# Patient Record
Sex: MALE | Race: BLACK OR AFRICAN AMERICAN | NOT HISPANIC OR LATINO | URBAN - METROPOLITAN AREA
[De-identification: names, ages, dates, MRNs, and addresses within clinical notes are randomized per-mention and may not be internally consistent; named-entity substitution may affect disease eponyms.]

---

## 2017-02-15 ENCOUNTER — HISTORICAL (OUTPATIENT)
Dept: INTERNAL MEDICINE | Facility: CLINIC | Age: 57
End: 2017-02-15

## 2017-10-25 LAB
COLOR STL: NORMAL
CONSISTENCY STL: NORMAL
HEMOCCULT SP1 STL QL: NEGATIVE

## 2017-10-27 LAB
COLOR STL: NORMAL
CONSISTENCY STL: NORMAL
HEMOCCULT SP2 STL QL: NEGATIVE

## 2017-10-29 LAB
COLOR STL: NORMAL
CONSISTENCY STL: NORMAL

## 2017-10-31 ENCOUNTER — HISTORICAL (OUTPATIENT)
Dept: INTERNAL MEDICINE | Facility: CLINIC | Age: 57
End: 2017-10-31

## 2017-11-06 ENCOUNTER — HISTORICAL (OUTPATIENT)
Dept: INTERNAL MEDICINE | Facility: CLINIC | Age: 57
End: 2017-11-06

## 2017-11-06 LAB
APPEARANCE, UA: CLEAR
BACTERIA #/AREA URNS AUTO: ABNORMAL /[HPF]
BILIRUB UR QL STRIP: NEGATIVE
COLOR UR: NORMAL
CREAT UR-MCNC: 142 MG/DL
GLUCOSE (UA): NORMAL
HAV IGM SERPL QL IA: NONREACTIVE
HBV CORE IGM SERPL QL IA: NONREACTIVE
HBV SURFACE AG SERPL QL IA: NEGATIVE
HCV AB SERPL QL IA: NONREACTIVE
HGB UR QL STRIP: NEGATIVE
HIV 1+2 AB+HIV1 P24 AG SERPL QL IA: NONREACTIVE
HYALINE CASTS #/AREA URNS LPF: ABNORMAL /[LPF]
KETONES UR QL STRIP: NEGATIVE
LEUKOCYTE ESTERASE UR QL STRIP: NEGATIVE
MICROALBUMIN UR-MCNC: 5 MG/L (ref 0–19)
MICROALBUMIN/CREAT RATIO PNL UR: NORMAL MCG/MG CR (ref 0–29)
NITRITE UR QL STRIP: NEGATIVE
PH UR STRIP: 7 [PH] (ref 4.5–8)
PROT UR QL STRIP: NEGATIVE
RBC #/AREA URNS AUTO: ABNORMAL /[HPF]
SP GR UR STRIP: 1.01 (ref 1–1.03)
SQUAMOUS #/AREA URNS LPF: ABNORMAL /[LPF]
TSH SERPL-ACNC: 1.26 MIU/L (ref 0.36–3.74)
UROBILINOGEN UR STRIP-ACNC: NORMAL
WBC #/AREA URNS AUTO: ABNORMAL /HPF

## 2017-11-14 ENCOUNTER — HISTORICAL (OUTPATIENT)
Dept: INTERNAL MEDICINE | Facility: CLINIC | Age: 57
End: 2017-11-14

## 2018-05-10 ENCOUNTER — HISTORICAL (OUTPATIENT)
Dept: INTERNAL MEDICINE | Facility: CLINIC | Age: 58
End: 2018-05-10

## 2018-05-10 LAB
ABS NEUT (OLG): 1.41 X10(3)/MCL (ref 2.1–9.2)
ALBUMIN SERPL-MCNC: 3.8 GM/DL (ref 3.4–5)
ALBUMIN/GLOB SERPL: 1 RATIO (ref 1–2)
ALP SERPL-CCNC: 67 UNIT/L (ref 45–117)
ALT SERPL-CCNC: 16 UNIT/L (ref 12–78)
APPEARANCE, UA: CLEAR
AST SERPL-CCNC: 17 UNIT/L (ref 15–37)
BACTERIA #/AREA URNS AUTO: ABNORMAL /[HPF]
BASOPHILS # BLD AUTO: 0.03 X10(3)/MCL
BASOPHILS NFR BLD AUTO: 1 %
BILIRUB SERPL-MCNC: 0.9 MG/DL (ref 0.2–1)
BILIRUB UR QL STRIP: NEGATIVE
BILIRUBIN DIRECT+TOT PNL SERPL-MCNC: 0.3 MG/DL
BILIRUBIN DIRECT+TOT PNL SERPL-MCNC: 0.6 MG/DL
BUN SERPL-MCNC: 17 MG/DL (ref 7–18)
CALCIUM SERPL-MCNC: 8.4 MG/DL (ref 8.5–10.1)
CHLORIDE SERPL-SCNC: 106 MMOL/L (ref 98–107)
CHOLEST SERPL-MCNC: 124 MG/DL
CHOLEST/HDLC SERPL: 2.4 {RATIO} (ref 0–5)
CO2 SERPL-SCNC: 30 MMOL/L (ref 21–32)
COLOR UR: YELLOW
CREAT SERPL-MCNC: 1.3 MG/DL (ref 0.6–1.3)
EOSINOPHIL # BLD AUTO: 0.08 X10(3)/MCL
EOSINOPHIL NFR BLD AUTO: 2 %
ERYTHROCYTE [DISTWIDTH] IN BLOOD BY AUTOMATED COUNT: 14.1 % (ref 11.5–14.5)
GLOBULIN SER-MCNC: 3.8 GM/ML (ref 2.3–3.5)
GLUCOSE (UA): NORMAL
GLUCOSE SERPL-MCNC: 79 MG/DL (ref 74–106)
HCT VFR BLD AUTO: 44.1 % (ref 40–51)
HDLC SERPL-MCNC: 52 MG/DL
HGB BLD-MCNC: 14.6 GM/DL (ref 13.5–17.5)
HGB UR QL STRIP: 0.03 MG/DL
HIV 1+2 AB+HIV1 P24 AG SERPL QL IA: NONREACTIVE
HYALINE CASTS #/AREA URNS LPF: ABNORMAL /[LPF]
IMM GRANULOCYTES # BLD AUTO: 0.01 10*3/UL
IMM GRANULOCYTES NFR BLD AUTO: 0 %
KETONES UR QL STRIP: NEGATIVE
LDLC SERPL CALC-MCNC: 59 MG/DL (ref 0–130)
LEUKOCYTE ESTERASE UR QL STRIP: NEGATIVE
LYMPHOCYTES # BLD AUTO: 1.74 X10(3)/MCL
LYMPHOCYTES NFR BLD AUTO: 48 % (ref 13–40)
MCH RBC QN AUTO: 28.6 PG (ref 26–34)
MCHC RBC AUTO-ENTMCNC: 33.1 GM/DL (ref 31–37)
MCV RBC AUTO: 86.3 FL (ref 80–100)
MONOCYTES # BLD AUTO: 0.36 X10(3)/MCL
MONOCYTES NFR BLD AUTO: 10 % (ref 4–12)
NEUTROPHILS # BLD AUTO: 1.41 X10(3)/MCL
NEUTROPHILS NFR BLD AUTO: 39 X10(3)/MCL
NITRITE UR QL STRIP: NEGATIVE
PH UR STRIP: 6.5 [PH] (ref 4.5–8)
PLATELET # BLD AUTO: 223 X10(3)/MCL (ref 130–400)
PMV BLD AUTO: 9.6 FL (ref 7.4–10.4)
POTASSIUM SERPL-SCNC: 3.8 MMOL/L (ref 3.5–5.1)
PROT SERPL-MCNC: 7.6 GM/DL (ref 6.4–8.2)
PROT UR QL STRIP: NEGATIVE
RBC # BLD AUTO: 5.11 X10(6)/MCL (ref 4.5–5.9)
RBC #/AREA URNS AUTO: ABNORMAL /[HPF]
SODIUM SERPL-SCNC: 139 MMOL/L (ref 136–145)
SP GR UR STRIP: 1.02 (ref 1–1.03)
SQUAMOUS #/AREA URNS LPF: ABNORMAL /[LPF]
TRIGL SERPL-MCNC: 65 MG/DL
UROBILINOGEN UR STRIP-ACNC: 2 MG/DL
VLDLC SERPL CALC-MCNC: 13 MG/DL
WBC # SPEC AUTO: 3.6 X10(3)/MCL (ref 4.5–11)
WBC #/AREA URNS AUTO: ABNORMAL /HPF

## 2018-09-21 ENCOUNTER — HISTORICAL (OUTPATIENT)
Dept: INTERNAL MEDICINE | Facility: CLINIC | Age: 58
End: 2018-09-21

## 2019-07-17 ENCOUNTER — HISTORICAL (OUTPATIENT)
Dept: ADMINISTRATIVE | Facility: HOSPITAL | Age: 59
End: 2019-07-17

## 2019-07-17 LAB
ABS NEUT (OLG): 1.76 X10(3)/MCL (ref 2.1–9.2)
ALBUMIN SERPL-MCNC: 3.6 GM/DL (ref 3.4–5)
ALBUMIN/GLOB SERPL: 1 RATIO (ref 1.1–2)
ALP SERPL-CCNC: 68 UNIT/L (ref 45–117)
ALT SERPL-CCNC: 19 UNIT/L (ref 12–78)
APPEARANCE, UA: CLEAR
AST SERPL-CCNC: 25 UNIT/L (ref 15–37)
BACTERIA #/AREA URNS AUTO: ABNORMAL /[HPF]
BASOPHILS # BLD AUTO: 0.03 X10(3)/MCL
BASOPHILS NFR BLD AUTO: 1 %
BILIRUB SERPL-MCNC: 0.5 MG/DL (ref 0.2–1)
BILIRUB UR QL STRIP: NEGATIVE
BILIRUBIN DIRECT+TOT PNL SERPL-MCNC: 0.2 MG/DL
BILIRUBIN DIRECT+TOT PNL SERPL-MCNC: 0.3 MG/DL
BUN SERPL-MCNC: 25 MG/DL (ref 7–18)
CALCIUM SERPL-MCNC: 8.5 MG/DL (ref 8.5–10.1)
CHLORIDE SERPL-SCNC: 109 MMOL/L (ref 98–107)
CHOLEST SERPL-MCNC: 134 MG/DL
CHOLEST/HDLC SERPL: 2.8 {RATIO} (ref 0–5)
CO2 SERPL-SCNC: 30 MMOL/L (ref 21–32)
COLOR UR: ABNORMAL
CREAT SERPL-MCNC: 1.4 MG/DL (ref 0.6–1.3)
EOSINOPHIL # BLD AUTO: 0.11 10*3/UL
EOSINOPHIL NFR BLD AUTO: 3 %
ERYTHROCYTE [DISTWIDTH] IN BLOOD BY AUTOMATED COUNT: 14 % (ref 11.5–14.5)
EST. AVERAGE GLUCOSE BLD GHB EST-MCNC: 131 MG/DL
GLOBULIN SER-MCNC: 3.6 GM/ML (ref 2.3–3.5)
GLUCOSE (UA): NORMAL
GLUCOSE SERPL-MCNC: 99 MG/DL (ref 74–106)
HAV IGM SERPL QL IA: NONREACTIVE
HBA1C MFR BLD: 6.2 % (ref 4.2–6.3)
HBV CORE IGM SERPL QL IA: NONREACTIVE
HBV SURFACE AG SERPL QL IA: NEGATIVE
HCT VFR BLD AUTO: 44.3 % (ref 40–51)
HCV AB SERPL QL IA: NONREACTIVE
HDLC SERPL-MCNC: 48 MG/DL
HGB BLD-MCNC: 14.4 GM/DL (ref 13.5–17.5)
HGB UR QL STRIP: 0.03 MG/DL
HIV 1+2 AB+HIV1 P24 AG SERPL QL IA: NONREACTIVE
HYALINE CASTS #/AREA URNS LPF: ABNORMAL /[LPF]
IMM GRANULOCYTES # BLD AUTO: 0.01 10*3/UL
IMM GRANULOCYTES NFR BLD AUTO: 0 %
KETONES UR QL STRIP: NEGATIVE
LDLC SERPL CALC-MCNC: 69 MG/DL (ref 0–130)
LEUKOCYTE ESTERASE UR QL STRIP: 25 LEU/UL
LYMPHOCYTES # BLD AUTO: 1.82 X10(3)/MCL
LYMPHOCYTES NFR BLD AUTO: 44 % (ref 13–40)
MCH RBC QN AUTO: 28.5 PG (ref 26–34)
MCHC RBC AUTO-ENTMCNC: 32.5 GM/DL (ref 31–37)
MCV RBC AUTO: 87.7 FL (ref 80–100)
MONOCYTES # BLD AUTO: 0.44 X10(3)/MCL
MONOCYTES NFR BLD AUTO: 11 % (ref 4–12)
NEUTROPHILS # BLD AUTO: 1.76 X10(3)/MCL
NEUTROPHILS NFR BLD AUTO: 42 X10(3)/MCL
NITRITE UR QL STRIP: NEGATIVE
PH UR STRIP: 6 [PH] (ref 4.5–8)
PLATELET # BLD AUTO: 194 X10(3)/MCL (ref 130–400)
PMV BLD AUTO: 9.6 FL (ref 7.4–10.4)
POTASSIUM SERPL-SCNC: 3.9 MMOL/L (ref 3.5–5.1)
PROT SERPL-MCNC: 7.2 GM/DL (ref 6.4–8.2)
PROT UR QL STRIP: NEGATIVE
RBC # BLD AUTO: 5.05 X10(6)/MCL (ref 4.5–5.9)
RBC #/AREA URNS AUTO: ABNORMAL /[HPF]
SODIUM SERPL-SCNC: 142 MMOL/L (ref 136–145)
SP GR UR STRIP: 1.02 (ref 1–1.03)
SQUAMOUS #/AREA URNS LPF: ABNORMAL /[LPF]
TRIGL SERPL-MCNC: 83 MG/DL
TSH SERPL-ACNC: 1.43 MIU/L (ref 0.36–3.74)
UROBILINOGEN UR STRIP-ACNC: 2 MG/DL
VLDLC SERPL CALC-MCNC: 17 MG/DL
WBC # SPEC AUTO: 4.2 X10(3)/MCL (ref 4.5–11)
WBC #/AREA URNS AUTO: ABNORMAL /HPF

## 2019-07-26 ENCOUNTER — HISTORICAL (OUTPATIENT)
Dept: INTERNAL MEDICINE | Facility: CLINIC | Age: 59
End: 2019-07-26

## 2020-08-06 ENCOUNTER — HISTORICAL (OUTPATIENT)
Dept: INTERNAL MEDICINE | Facility: CLINIC | Age: 60
End: 2020-08-06

## 2020-08-06 LAB
ABS NEUT (OLG): 1.44 X10(3)/MCL (ref 2.1–9.2)
ALBUMIN SERPL-MCNC: 3.5 GM/DL (ref 3.4–5)
ALBUMIN/GLOB SERPL: 0.9 RATIO (ref 1.1–2)
ALP SERPL-CCNC: 66 UNIT/L (ref 45–117)
ALT SERPL-CCNC: 19 UNIT/L (ref 12–78)
AST SERPL-CCNC: 13 UNIT/L (ref 15–37)
BASOPHILS # BLD AUTO: 0 X10(3)/MCL (ref 0–0.2)
BASOPHILS NFR BLD AUTO: 1 %
BILIRUB SERPL-MCNC: 0.6 MG/DL (ref 0.2–1)
BILIRUBIN DIRECT+TOT PNL SERPL-MCNC: 0.2 MG/DL (ref 0–0.2)
BILIRUBIN DIRECT+TOT PNL SERPL-MCNC: 0.4 MG/DL
BUN SERPL-MCNC: 19 MG/DL (ref 7–18)
CALCIUM SERPL-MCNC: 8.8 MG/DL (ref 8.5–10.1)
CHLORIDE SERPL-SCNC: 109 MMOL/L (ref 98–107)
CHOLEST SERPL-MCNC: 120 MG/DL
CHOLEST/HDLC SERPL: 2.3 {RATIO} (ref 0–5)
CO2 SERPL-SCNC: 29 MMOL/L (ref 21–32)
CREAT SERPL-MCNC: 1.3 MG/DL (ref 0.6–1.3)
EOSINOPHIL # BLD AUTO: 0.1 X10(3)/MCL (ref 0–0.9)
EOSINOPHIL NFR BLD AUTO: 4 %
ERYTHROCYTE [DISTWIDTH] IN BLOOD BY AUTOMATED COUNT: 14.3 % (ref 11.5–14.5)
EST. AVERAGE GLUCOSE BLD GHB EST-MCNC: 134 MG/DL
GLOBULIN SER-MCNC: 4 GM/ML (ref 2.3–3.5)
GLUCOSE SERPL-MCNC: 97 MG/DL (ref 74–106)
HBA1C MFR BLD: 6.3 % (ref 4.2–6.3)
HCT VFR BLD AUTO: 42.4 % (ref 40–51)
HDLC SERPL-MCNC: 52 MG/DL (ref 40–59)
HGB BLD-MCNC: 14 GM/DL (ref 13.5–17.5)
LDLC SERPL CALC-MCNC: 50 MG/DL
LYMPHOCYTES # BLD AUTO: 1.5 X10(3)/MCL (ref 0.6–4.6)
LYMPHOCYTES NFR BLD AUTO: 43 %
MCH RBC QN AUTO: 28.9 PG (ref 26–34)
MCHC RBC AUTO-ENTMCNC: 33 GM/DL (ref 31–37)
MCV RBC AUTO: 87.4 FL (ref 80–100)
MONOCYTES # BLD AUTO: 0.4 X10(3)/MCL (ref 0.1–1.3)
MONOCYTES NFR BLD AUTO: 11 %
NEUTROPHILS # BLD AUTO: 1.44 X10(3)/MCL (ref 2.1–9.2)
NEUTROPHILS NFR BLD AUTO: 42 %
PLATELET # BLD AUTO: 196 X10(3)/MCL (ref 130–400)
PMV BLD AUTO: 9.3 FL (ref 7.4–10.4)
POTASSIUM SERPL-SCNC: 4.2 MMOL/L (ref 3.5–5.1)
PROT SERPL-MCNC: 7.5 GM/DL (ref 6.4–8.2)
RBC # BLD AUTO: 4.85 X10(6)/MCL (ref 4.5–5.9)
SODIUM SERPL-SCNC: 140 MMOL/L (ref 136–145)
TRIGL SERPL-MCNC: 91 MG/DL
TSH SERPL-ACNC: 1.13 MIU/L (ref 0.36–3.74)
VLDLC SERPL CALC-MCNC: 18 MG/DL
WBC # SPEC AUTO: 3.4 X10(3)/MCL (ref 4.5–11)

## 2020-11-20 ENCOUNTER — HISTORICAL (OUTPATIENT)
Dept: RESPIRATORY THERAPY | Facility: HOSPITAL | Age: 60
End: 2020-11-20

## 2020-11-27 ENCOUNTER — HISTORICAL (OUTPATIENT)
Dept: RADIOLOGY | Facility: HOSPITAL | Age: 60
End: 2020-11-27

## 2021-02-02 ENCOUNTER — HISTORICAL (OUTPATIENT)
Dept: INTERNAL MEDICINE | Facility: CLINIC | Age: 61
End: 2021-02-02

## 2021-02-02 LAB
ABS NEUT (OLG): 1.28 X10(3)/MCL (ref 2.1–9.2)
ALBUMIN SERPL-MCNC: 3.5 GM/DL (ref 3.4–4.8)
ALBUMIN/GLOB SERPL: 1.1 RATIO (ref 1.1–2)
ALP SERPL-CCNC: 57 UNIT/L (ref 40–150)
ALT SERPL-CCNC: 12 UNIT/L (ref 0–55)
AST SERPL-CCNC: 21 UNIT/L (ref 5–34)
BASOPHILS # BLD AUTO: 0 X10(3)/MCL (ref 0–0.2)
BASOPHILS NFR BLD AUTO: 1 %
BILIRUB SERPL-MCNC: 0.6 MG/DL
BILIRUBIN DIRECT+TOT PNL SERPL-MCNC: 0.3 MG/DL (ref 0–0.5)
BILIRUBIN DIRECT+TOT PNL SERPL-MCNC: 0.3 MG/DL (ref 0–0.8)
BUN SERPL-MCNC: 16 MG/DL (ref 8.4–25.7)
CALCIUM SERPL-MCNC: 8.8 MG/DL (ref 8.8–10)
CHLORIDE SERPL-SCNC: 106 MMOL/L (ref 98–107)
CHOLEST SERPL-MCNC: 141 MG/DL
CHOLEST/HDLC SERPL: 3 {RATIO} (ref 0–5)
CO2 SERPL-SCNC: 30 MMOL/L (ref 23–31)
CREAT SERPL-MCNC: 1.22 MG/DL (ref 0.73–1.18)
EOSINOPHIL # BLD AUTO: 0.1 X10(3)/MCL (ref 0–0.9)
EOSINOPHIL NFR BLD AUTO: 4 %
ERYTHROCYTE [DISTWIDTH] IN BLOOD BY AUTOMATED COUNT: 14.3 % (ref 11.5–14.5)
EST. AVERAGE GLUCOSE BLD GHB EST-MCNC: 122.6 MG/DL
GLOBULIN SER-MCNC: 3.2 GM/DL (ref 2.4–3.5)
GLUCOSE SERPL-MCNC: 97 MG/DL (ref 82–115)
HBA1C MFR BLD: 5.9 %
HCT VFR BLD AUTO: 42.1 % (ref 40–51)
HDLC SERPL-MCNC: 48 MG/DL (ref 35–60)
HGB BLD-MCNC: 13.6 GM/DL (ref 13.5–17.5)
IMM GRANULOCYTES # BLD AUTO: 0.01 10*3/UL
IMM GRANULOCYTES NFR BLD AUTO: 0 %
LDLC SERPL CALC-MCNC: 83 MG/DL (ref 50–140)
LYMPHOCYTES # BLD AUTO: 1.6 X10(3)/MCL (ref 0.6–4.6)
LYMPHOCYTES NFR BLD AUTO: 47 %
MCH RBC QN AUTO: 28.5 PG (ref 26–34)
MCHC RBC AUTO-ENTMCNC: 32.3 GM/DL (ref 31–37)
MCV RBC AUTO: 88.3 FL (ref 80–100)
MONOCYTES # BLD AUTO: 0.4 X10(3)/MCL (ref 0.1–1.3)
MONOCYTES NFR BLD AUTO: 11 %
NEUTROPHILS # BLD AUTO: 1.28 X10(3)/MCL (ref 2.1–9.2)
NEUTROPHILS NFR BLD AUTO: 37 %
PLATELET # BLD AUTO: 197 X10(3)/MCL (ref 130–400)
PMV BLD AUTO: 9.5 FL (ref 7.4–10.4)
POTASSIUM SERPL-SCNC: 3.9 MMOL/L (ref 3.5–5.1)
PROT SERPL-MCNC: 6.7 GM/DL (ref 5.8–7.6)
PSA SERPL-MCNC: 2.25 NG/ML
RBC # BLD AUTO: 4.77 X10(6)/MCL (ref 4.5–5.9)
SODIUM SERPL-SCNC: 142 MMOL/L (ref 136–145)
TRIGL SERPL-MCNC: 50 MG/DL (ref 34–140)
TSH SERPL-ACNC: 1.13 UIU/ML (ref 0.35–4.94)
VLDLC SERPL CALC-MCNC: 10 MG/DL
WBC # SPEC AUTO: 3.5 X10(3)/MCL (ref 4.5–11)

## 2021-06-30 ENCOUNTER — HISTORICAL (OUTPATIENT)
Dept: INTERNAL MEDICINE | Facility: CLINIC | Age: 61
End: 2021-06-30

## 2022-04-07 ENCOUNTER — HISTORICAL (OUTPATIENT)
Dept: ADMINISTRATIVE | Facility: HOSPITAL | Age: 62
End: 2022-04-07

## 2022-04-23 VITALS
SYSTOLIC BLOOD PRESSURE: 120 MMHG | HEIGHT: 65 IN | WEIGHT: 144.63 LBS | OXYGEN SATURATION: 95 % | BODY MASS INDEX: 24.1 KG/M2 | DIASTOLIC BLOOD PRESSURE: 74 MMHG

## 2022-05-01 NOTE — HISTORICAL [facility] CERNER
This is a historical note converted from [employer]. Formatting and pictures may have been removed.  Please reference [employer] for original formatting and attached multimedia. Chief Complaint  bilateral foot fungus  History of Present Illness  This patient is a 58 year old  male here today for a follow up. He has a past medical history of HTN, impaired fasting glucose, COPD, asthma, BPH, and right carpal tunnel syndrome. 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 Started using?OTV antifungal powder last week.?Reports good control of asthma/COPD. Using rescue inhaler about once a day or less. CTS well controlled with use of?night splint. Successfully quit smoking on his?own. Reviewed and discussed lab results. ?No complaints today.  Review of Systems  See HPI for details  ?   Constitutional: Denies Change in appetite. Denies Chills. Denies?Fever. Denies Night sweats.  Eye: Denies Blurred vision. Denies Discharge. Denies?Eye pain.  ENT: Denies Decreased hearing. Denies Sore throat. Denies Swollen glands.  Respiratory: Denies Cough. Denies Shortness of breath. Denies Shortness of breath with exertion. Denies Wheezing.  Cardiovascular: Denies Chest pain at rest. Denies Chest pain with exertion. Denies Irregular heartbeat. Denies Palpitations.  Gastrointestinal: Denies Abdominal pain. Denies Diarrhea. Denies Nausea. Denies Vomiting. Denies Hematemesis or Hematochezia.  Genitourinary: Denies Dysuria. Denies Urinary frequency. Denies Urinary urgency. Denies Blood in urine.  Endocrine: Denies Cold intolerance. Denies Excessive thirst. Denies Heat intolerance. Denies Weight loss. Denies Weight gain.  Musculoskeletal: Denies Painful joints. Denies Weakness.  Integumentary: Denies Rash. Denies Itching. Denies Dry skin.  Neurologic: Denies Dizziness. Denies Fainting. Denies Headache.  Psychiatric: Denies Depression. Denies Anxiety. Denies Suicidal/Homicidal ideations.  ?   All Other  ROS: Negative except as stated in HPI.  Physical Exam  Vitals & Measurements  T:?36.9? ?C (Oral)? HR:?60(Peripheral)? RR:?16? BP:?136/85?  HT:?168?cm? WT:?70.5?kg? BMI:?24.98?  General: Alert and oriented, No acute distress.  Head: Normocephalic, Atraumatic.  Eye: Pupils are equal, round and reactive to light, Extraocular movements are intact, Sclera non-icteric.  Ears/Nose/Throat:Normal, Mucosa moist,Clear.  Neck/Thyroid: Supple, Non-tender,No carotid bruit,No palpable thyromegaly or thyroid nodule,?No lymphadenopathy, No JVD, Full range of motion.  Respiratory:Clear to auscultation bilaterally; No wheezes, rales or rhonchi,Non-labored respirations, Symmetrical chest wall expansion.  Cardiovascular:Regular rate and rhythm,S1/S2 normal,No murmurs, rubs or gallops.  Gastrointestinal:Soft,Non-tender,Non-distended,Normal bowel sounds,No palpable organomegaly.  Musculoskeletal: Normal range of motion.  Integumentary: Warm, Dry, Intact,No suspicious lesions, Tinea pedis interdigitally bilaterally  Extremities:No clubbing, cyanosis or edema  Neurologic: No focal deficits, Cranial Nerves II-XII are grossly intact, Motor strength normal upper and lower extremities, Sensory exam intact.  Psychiatric: Normal interaction,Coherent speech,?Euthymic mood,Appropriate affect  Assessment/Plan  1.?Hypertension?I10  ???Continue Amlodipine 5mg daily.  Low Sodium Diet (Dash Diet - less than 2 grams of sodium per day).  Monitor Blood Pressure daily and log. Report any consistent numbers greater than 140/90.  Maintain healthy weight with goal BMI <30. Exercise 30 minutes per day 5 days per week.  Ordered:  1160F- Medication reconciliation completed during visit, Hypertension  COPD (chronic obstructive pulmonary disease)  Impaired fasting glucose, [facility], 07/17/19 9:09:00 CDT  Clinic Follow up, *Est. 01/17/20 3:00:00 CST, Order for future visit, Hypertension  COPD (chronic obstructive pulmonary disease)  Impaired fasting  glucose, [facility]  Office/Outpatient Visit Level 4 Established [id number] PC, Hypertension  COPD (chronic obstructive pulmonary disease)  Impaired fasting glucose, [facility], 07/17/19 9:09:00 CDT  ?  2.?COPD (chronic obstructive pulmonary disease)?J44.9  ??Last PFT 11/2017 - Moderate obstruction with + bronchodilator response. / Last CXR 9/2017 - Normal.  ?Continue Advair 250mcg/50mcg BID and?albuterol QID PRN.  ?Use inhalers as prescribed (rinse mouth after use of steroid inhalers). Use long term inhalers?daily and rescue inhaler as needed.  Avoid triggers (high humidity, strong odors, chemical fumes).  Report signs of upper respiratory infection immediately for early treatment.  Flu shot recommended yearly.  Practice abdominal breathing. Eat smaller, more frequent meals.  Ordered:  1160F- Medication reconciliation completed during visit, Hypertension  COPD (chronic obstructive pulmonary disease)  Impaired fasting glucose, [facility], 07/17/19 9:09:00 CDT  Clinic Follow up, *Est. 01/17/20 3:00:00 CST, Order for future visit, Hypertension  COPD (chronic obstructive pulmonary disease)  Impaired fasting glucose, [facility]  Office/Outpatient Visit Level 4 Established [id number] PC, Hypertension  COPD (chronic obstructive pulmonary disease)  Impaired fasting glucose, [facility], 07/17/19 9:09:00 CDT  ?  3.?Impaired fasting glucose?R73.01  ?A1C 6.2 -?7/2019.  Follow ADA Diet. Avoid soda, simple sweets, and limit rice/pasta/breads/starches.  Maintain healthy weight with goal BMI <30.? Exercise 5 times?per week for 30?minutes per day.  Ordered:  1160F- Medication reconciliation completed during visit, Hypertension  COPD (chronic obstructive pulmonary disease)  Impaired fasting glucose, [facility], 07/17/19 9:09:00 CDT  Clinic Follow up, *Est. 01/17/20 3:00:00 CST, Order for future visit, Hypertension  COPD (chronic obstructive pulmonary disease)  Impaired fasting glucose, [facility]  Office/Outpatient Visit Level 4 Established [id number] PC, Hypertension  COPD (chronic obstructive pulmonary disease)  Impaired fasting glucose, [facility], 07/17/19 9:09:00 CDT  ?  4.?Tinea pedis of both feet?B35.3  ?Clotrimazole cream interdigitally BID.  Stressed importance of keeping feet dry and continuing antifungal powder use.  ?  Orders:  albuterol, See Instructions, PRN PRN cough or wheezing, INHALE 2 PUFFS BY MOUTH 4 TIMES DAILY, # 18 gm, 2 Refill(s), Pharmacy: [facility]  amLODIPine, 5 mg = 1 tab(s), Oral, Daily, # 30 tab(s), 11 Refill(s), Pharmacy: [facility]  aspirin, 81 mg = 1 tab(s), Oral, Daily, # 30 tab(s), 11 Refill(s), Pharmacy: [facility]  clotrimazole topical, 1 samir, TOP, BID, # 45 gm, 1 Refill(s), Pharmacy: [facility]  fluticasone-salmeterol, 1 inh, INH, BID, # 60 EA, 11 Refill(s), Pharmacy: [facility]  tetanus/diphth/pertuss (Tdap) adult/adol, 0.5 mL, form: Injection, IM, Once-Unscheduled, first dose 07/17/19 9:08:00 CDT  Hepatitis Panel [facility], Routine collect, 07/17/19 6:48:00 CDT, Blood, Stop date 07/17/19 6:48:00 CDT, Lab Collect, 07/17/19 6:48:00 CDT  Occult Blood Fecal Immunoassay, Routine collect, Stool, Order for future visit, 07/17/19 9:09:00 CDT, Stop date 07/17/19 9:09:00 CDT, Nurse collect, Screening for malignant neoplasm of colon  Follow up in 6 months and PRN.  Referrals  Clinic Follow up, *Est. 01/17/20 3:00:00 CST, Order for future visit, Hypertension  COPD (chronic obstructive pulmonary disease)  Impaired fasting glucose, [facility]   Problem List/Past Medical History  Ongoing  Asthma  BPH (benign prostatic hyperplasia)  Carpal tunnel syndrome  COPD (chronic obstructive pulmonary disease)  Hypertension  Impaired fasting glucose  Tobacco user  Tobacco user  Well adult exam  Historical  No qualifying data  Procedure/Surgical History  denies   Medications  Advair Diskus 250 mcg-50 mcg inhalation  powder, 1 inh, INH, BID, 11 refills  aspirin 81 mg oral Delayed Release (EC) tablet, 81 mg= 1 tab(s), Oral, Daily, 11 refills  clotrimazole 1% topical cream, 1 samir, TOP, BID, 1 refills  Norvasc 5 mg oral tablet, 5 mg= 1 tab(s), Oral, Daily, 11 refills  Proventil HFA 90 mcg/inh inhalation aerosol with adapter, See Instructions, PRN, 2 refills  tetanus/diphth/pertuss (Tdap) adult/adol 5 units-2.5 units-18.5 mcg/0.5 mL intramuscular suspension, 0.5 mL, IM, Once-Unscheduled  Allergies  No Known Allergies  Social History  Abuse/Neglect  No, No, Yes, 07/17/2019  Alcohol - Denies Alcohol Use, 10/21/2014  Employment/School  Employed, Work/School description: [employer]. Highest education level: None. Operates hazardous equipment: Yes. Workplace hazards: Hazardous materials, Heavy lifting/twisting, Loud noises., 10/19/2016  Home/Environment  Lives with Children, Spouse. Living situation: Home/Independent. Home equipment: BP MONITOR. Alcohol abuse in household: No. Substance abuse in household: No. Smoker in household: Yes. Feels unsafe at home: No. Safe place to go: Yes. Family/Friends available for support: Yes., 10/19/2016  Nutrition/Health  Regular, Caffeine intake amount: COOFFEE 1 CUP IN MORNING. 1 SODA. Wants to lose weight: No. Sleeping concerns: No. Feels highly stressed: Yes., 10/19/2016  Substance Use - Denies Substance Abuse, 10/21/2014  Past, 03/12/2016  Tobacco - Medium Risk, 10/21/2014  Former smoker, quit more than 30 days ago, N/A, 07/17/2019  Family History  Alzheimers disease: Mother.  Cancer - unknown origin: Father.  Throat cancer: Brother.  Immunizations  Vaccine Date Status   pneumococcal 13-valent conjugate vaccine 01/16/2019 Given   Health Maintenance  Health Maintenance  ???Pending?(in the next year)  ??? ??OverDue  ??? ? ? ?Asthma Management-Asthma Medication Prescribed due??and every?  ??? ? ? ?COPD Maintenance-Spirometry due??and every?  ??? ? ? ?Diabetes Screening due??and every?  ??? ? ?  ?Smoking Cessation due??01/01/19??and every 1??year(s)  ??? ??Due?  ??? ? ? ?Lung Cancer Screening due??07/17/19??and every 1??year(s)  ??? ??Refused?  ??? ? ? ?Tetanus Vaccine due??07/17/19??and every 10??year(s)  ??? ??Due In Future?  ??? ? ? ?Colorectal Screening not due until??09/21/19??and every 1??year(s)  ??? ? ? ?Alcohol Misuse Screening not due until??01/01/20??and every 1??year(s)  ??? ? ? ?Obesity Screening not due until??01/01/20??and every 1??year(s)  ??? ? ? ?Blood Pressure Screening not due until??07/16/20??and every 1??year(s)  ??? ? ? ?Body Mass Index Check not due until??07/16/20??and every 1??year(s)  ??? ? ? ?Depression Screening not due until??07/16/20??and every 1??year(s)  ??? ? ? ?Hypertension Management-Blood Pressure not due until??07/16/20??and every 1??year(s)  ??? ? ? ?Hypertension Management-BMP not due until??07/16/20??and every 1??year(s)  ???Satisfied?(in the past 1 year)  ??? ??Satisfied?  ??? ? ? ?ADL Screening on??07/17/19.??Satisfied by Edie Sifuentes LPN  ??? ? ? ?Alcohol Misuse Screening on??01/16/19.??Satisfied by Levar Houser  ??? ? ? ?Aspirin Therapy for CVD Prevention on??07/17/19.??Satisfied by Levar Houser  ??? ? ? ?Asthma Management-Asthma Medication Prescribed on??07/17/19.??Satisfied by Levar oHuser  ??? ? ? ?Blood Pressure Screening on??07/17/19.??Satisfied by Edie Sifuentes LPN  ??? ? ? ?Body Mass Index Check on??07/17/19.??Satisfied by Edie Sifuentes LPN  ??? ? ? ?Colorectal Screening on??09/21/18.??Satisfied by Kinga Tee  ??? ? ? ?Depression Screening on??07/17/19.??Satisfied by Edie Sifuentes LPN  ??? ? ? ?Diabetes Screening on??07/17/19.??Satisfied by Ben Graff Jr.  ??? ? ? ?Hypertension Management-Blood Pressure on??07/17/19.??Satisfied by Edie Sifuentes LPN  ??? ? ? ?Hypertension Management-BMP on??07/17/19.??Satisfied by Ben Graff Jr.  ??? ? ? ?Influenza Vaccine on??01/16/19.??Satisfied by  Gregorio STEPHENSON, Joanna BOYER.  ??? ? ? ?Lipid Screening on??07/17/19.??Satisfied by Ben Graff Jr.  ??? ? ? ?Obesity Screening on??07/17/19.??Satisfied by Edie Sifuentes LPN  ??? ??Refused?  ??? ? ? ?Tetanus Vaccine on??01/16/19.??Recorded by Levar Houser??Reason: Patient Refuses  ?